# Patient Record
Sex: MALE | Race: BLACK OR AFRICAN AMERICAN | Employment: UNEMPLOYED | URBAN - METROPOLITAN AREA
[De-identification: names, ages, dates, MRNs, and addresses within clinical notes are randomized per-mention and may not be internally consistent; named-entity substitution may affect disease eponyms.]

---

## 2020-05-19 ENCOUNTER — HOSPITAL ENCOUNTER (EMERGENCY)
Facility: CLINIC | Age: 25
Discharge: HOME | End: 2020-05-19
Attending: EMERGENCY MEDICINE | Admitting: EMERGENCY MEDICINE

## 2020-05-19 VITALS
OXYGEN SATURATION: 99 % | HEIGHT: 71 IN | BODY MASS INDEX: 22.68 KG/M2 | SYSTOLIC BLOOD PRESSURE: 131 MMHG | DIASTOLIC BLOOD PRESSURE: 86 MMHG | HEART RATE: 84 BPM | RESPIRATION RATE: 16 BRPM | WEIGHT: 162 LBS | TEMPERATURE: 98.2 F

## 2020-05-19 DIAGNOSIS — R82.90 ABNORMAL URINALYSIS: ICD-10-CM

## 2020-05-19 DIAGNOSIS — N34.2 URETHRITIS: ICD-10-CM

## 2020-05-19 LAB
ALBUMIN UR-MCNC: 30 MG/DL
APPEARANCE UR: ABNORMAL
BACTERIA #/AREA URNS HPF: ABNORMAL /HPF
BILIRUB UR QL STRIP: NEGATIVE
COLOR UR AUTO: YELLOW
GLUCOSE UR STRIP-MCNC: NEGATIVE MG/DL
HGB UR QL STRIP: ABNORMAL
KETONES UR STRIP-MCNC: NEGATIVE MG/DL
LEUKOCYTE ESTERASE UR QL STRIP: ABNORMAL
MUCOUS THREADS #/AREA URNS LPF: PRESENT /LPF
NITRATE UR QL: NEGATIVE
PH UR STRIP: 6 PH (ref 5–7)
RBC #/AREA URNS AUTO: 134 /HPF (ref 0–2)
SOURCE: ABNORMAL
SP GR UR STRIP: 1.02 (ref 1–1.03)
UROBILINOGEN UR STRIP-MCNC: NORMAL MG/DL (ref 0–2)
WBC #/AREA URNS AUTO: >182 /HPF (ref 0–5)
WBC CLUMPS #/AREA URNS HPF: PRESENT /HPF

## 2020-05-19 PROCEDURE — 25000125 ZZHC RX 250: Performed by: EMERGENCY MEDICINE

## 2020-05-19 PROCEDURE — 87591 N.GONORRHOEAE DNA AMP PROB: CPT | Performed by: EMERGENCY MEDICINE

## 2020-05-19 PROCEDURE — 99284 EMERGENCY DEPT VISIT MOD MDM: CPT | Mod: 25

## 2020-05-19 PROCEDURE — 25000128 H RX IP 250 OP 636: Performed by: EMERGENCY MEDICINE

## 2020-05-19 PROCEDURE — 25000132 ZZH RX MED GY IP 250 OP 250 PS 637: Performed by: EMERGENCY MEDICINE

## 2020-05-19 PROCEDURE — 81001 URINALYSIS AUTO W/SCOPE: CPT | Performed by: EMERGENCY MEDICINE

## 2020-05-19 PROCEDURE — 87086 URINE CULTURE/COLONY COUNT: CPT | Performed by: EMERGENCY MEDICINE

## 2020-05-19 PROCEDURE — 96372 THER/PROPH/DIAG INJ SC/IM: CPT

## 2020-05-19 PROCEDURE — 87491 CHLMYD TRACH DNA AMP PROBE: CPT | Performed by: EMERGENCY MEDICINE

## 2020-05-19 RX ORDER — SULFAMETHOXAZOLE/TRIMETHOPRIM 800-160 MG
1 TABLET ORAL 2 TIMES DAILY
Qty: 14 TABLET | Refills: 0 | Status: SHIPPED | OUTPATIENT
Start: 2020-05-19 | End: 2020-05-26

## 2020-05-19 RX ORDER — AZITHROMYCIN 250 MG/1
1000 TABLET, FILM COATED ORAL ONCE
Status: COMPLETED | OUTPATIENT
Start: 2020-05-19 | End: 2020-05-19

## 2020-05-19 RX ADMIN — LIDOCAINE HYDROCHLORIDE 250 MG: 10 INJECTION, SOLUTION EPIDURAL; INFILTRATION; INTRACAUDAL; PERINEURAL at 10:56

## 2020-05-19 RX ADMIN — AZITHROMYCIN MONOHYDRATE 1000 MG: 250 TABLET ORAL at 10:56

## 2020-05-19 SDOH — HEALTH STABILITY: MENTAL HEALTH: HOW OFTEN DO YOU HAVE A DRINK CONTAINING ALCOHOL?: NEVER

## 2020-05-19 ASSESSMENT — MIFFLIN-ST. JEOR: SCORE: 1731.96

## 2020-05-19 NOTE — ED AVS SNAPSHOT
Emergency Department  [address] [place] [id number]  Phone:  [phone]  Fax:  [phone]                                    Taz Jones   MRN: [id number]    Department:   Emergency Department   Date of Visit:  5/19/2020           After Visit Summary Signature Page    I have received my discharge instructions, and my questions have been answered. I have discussed any challenges I see with this plan with the nurse or doctor.    ..........................................................................................................................................  Patient/Patient Representative Signature      ..........................................................................................................................................  Patient Representative Print Name and Relationship to Patient    ..................................................               ................................................  Date                                   Time    ..........................................................................................................................................  Reviewed by Signature/Title    ...................................................              ..............................................  Date                                               Time          22EPIC Rev 08/18

## 2020-05-19 NOTE — ED PROVIDER NOTES
"   History     Chief Complaint: Hematuria and Dysuria    HPI   Taz Chacon is a 25 year old male who presents with hematuria and dysuria.  Starting yesterday, he noticed discomfort when he urinates, which is especially intense at the end of urination, and he has noticed a few drops of blood.  He has no new back pain.  No history of kidney stones.  He has had 2 sexual partners in the last 6 months, though denies any known specific exposure to STDs or prior similar symptoms.  He is not on blood thinners.  He has not taken any analgesics.    Allergies:  No known drug allergies    Medications:    The patient is not currently taking any prescribed medications.    Past Medical History:    Attention deficit hyperactivity disorder   Oppositional defiant disorder of childhood or adolescence    Past Surgical History:    History reviewed. No pertinent surgical history.    Family History:    History reviewed. No pertinent family history.     Social History:  Smoking status: Daily smoker  PCP: Physician No Ref-Primary    Review of Systems   All other systems reviewed and are negative.    Physical Exam     Patient Vitals for the past 24 hrs:   BP Temp Temp src Pulse Resp SpO2 Height Weight   05/19/20 0822 131/86 98.2  F (36.8  C) Oral 84 16 99 % 1.803 m (5' 11\") 73.5 kg (162 lb)     Physical Exam  General: Nontoxic-appearing male sitting upright in room 25  HENT: mucous membranes moist  CV: rate as above  Resp: normal effort, speaks in full phrases  GI: abdomen soft and nontender, no guarding, no palpable masses  MSK:   Thoracic spine: nontender, no CVAT  Lumbar spine: nontender  Pelvis stable.  : normal penis without urethral discharge, nontender testes with normal lie, no scrotal masses or fluctuance, epididymis nontender  Skin: appropriately warm and dry, no erythema/ecchymosis/vesicles to back  Neuro: alert, clear speech, oriented, ambulatory  Psych: normal mood and affect    Emergency Department Course "   Laboratory:  Urinalysis: Moderate blood, protein albumin 30, large leukocyte, WBC/HPF >182 (H), RBC/ (H), WBC clumps present, few bacteria, mucous present, o/w WNL     Chlamydia and gonorrhea: pending  Urine culture: pending    Procedures: None.    Interventions:  1056 Ceftriaxone 250 mg in lidocaine 1% IM  1056 Zithromax 1000 mg PO    Emergency Department Course:  Past medical records, nursing notes, and vitals reviewed.  0938: I performed an exam of the patient and obtained history, as documented above.    The patient provided a urine sample here in the emergency department. This was sent for laboratory testing, findings above.    1030: I rechecked the patient. Findings and plan explained to the patient. Patient discharged home with instructions regarding supportive care, medications, and reasons to return. The importance of close follow-up was reviewed.     Impression & Plan    Medical Decision Making:  I think his symptoms are most likely secondary to urethritis, perhaps a sexually transmitted infection based on his sexual history, diagnostic uncertainty and rationale for empiric treatment was reviewed with him.  Urine culture was sent in addition to Chlamydia and gonorrhea, and I elected to initiate Bactrim as well to treat the possibility of bacterial cystitis, given challenges with obtaining outpatient clinic follow-up during this pandemic.  He does not have flank pain, colicky discomfort, or other features to suggest ureteral stone, pyelonephritis, systemic illness, or the need for further testing or treatment at this time.  He is content with this plan and was discharged home.  He was advised to refrain from sexual intercourse until follow-up testing is back.      Diagnosis:    ICD-10-CM    1. Urethritis  N34.2    2. Abnormal urinalysis  R82.90        Disposition: discharged to home with Bactrim    Discharge Medications:  New Prescriptions    SULFAMETHOXAZOLE-TRIMETHOPRIM (BACTRIM DS) 800-160 MG  TABLET    Take 1 tablet by mouth 2 times daily for 7 days     I, Bradley Carrillo, am serving as a scribe at 9:25 AM on 5/19/2020 to document services personally performed by Gerard Eaton MD based on my observations and the provider's statements to me.     This record was created at least in part using electronic voice recognition software, so please excuse any typographical errors.    Bradley Carrillo  5/19/2020    EMERGENCY DEPARTMENT       Gerard Eaton MD  05/19/20 1181

## 2020-05-20 ENCOUNTER — TELEPHONE (OUTPATIENT)
Dept: EMERGENCY MEDICINE | Facility: CLINIC | Age: 25
End: 2020-05-20

## 2020-05-20 LAB
BACTERIA SPEC CULT: NO GROWTH
C TRACH DNA SPEC QL NAA+PROBE: NEGATIVE
Lab: NORMAL
N GONORRHOEA DNA SPEC QL NAA+PROBE: POSITIVE
SPECIMEN SOURCE: ABNORMAL
SPECIMEN SOURCE: NORMAL
SPECIMEN SOURCE: NORMAL

## 2020-05-20 NOTE — TELEPHONE ENCOUNTER
"[facility] Emergency Department Lab result notification:    [place] ED lab result protocol used  N Gonorrhea protocol    Reason for call  Notify of lab results, assess symptoms,  review ED providers recommendations/discharge instructions (if necessary) and advise per ED lab result f/u protocol    Lab Result   Final N. Gonorrhoeae PCR is [POSITIVE] AND Chlamydia T PCR is [NEGATIVE]   Patient was treated for N. Gonorrhea and Chlamydia T in the ED  [Yes or No]:  Yes       If Yes, list what was given in the ED (dual treatment for N Gonorrhea):  Azithromycin 1000 mg PO x 1 AND Ceftriaxone (Rocephin) 250 mg IM x 1   If treated appropriately in the [facility] ED, notify patient of result and STD instructions.     Final urine culture report shows \"NO GROWTH\" and is NEGATIVE.   Emergency Dept discharge antibiotic: Sulfamethoxazole-Trimethoprim (Bactrim DS, Septra DS) 800-160 mg PO tablet,  1 tablet by mouth 2 times daily for 7 days.   Is ED discharge Rx antibiotic for UTI only (Yes/No): Yes, Abnormal urinalysis   Recommendations per [facility] ED Lab result protocol - Urine culture protocol.   Information table from ED Provider visit on 5/19/2020  Symptoms reported at ED visit (Chief complaint, HPI) Chief Complaint: Hematuria and Dysuria    HPI   Taz CarrilloJoriRobert is a 25 year old male who presents with hematuria and dysuria.  Starting yesterday, he noticed discomfort when he urinates, which is especially intense at the end of urination, and he has noticed a few drops of blood.  He has no new back pain.  No history of kidney stones.  He has had 2 sexual partners in the last 6 months, though denies any known specific exposure to STDs or prior similar symptoms.  He is not on blood thinners.  He has not taken any analgesics.   ED providers Impression and Plan (applicable information) I think his symptoms are most likely secondary to urethritis, perhaps a sexually transmitted infection based on his sexual " history, diagnostic uncertainty and rationale for empiric treatment was reviewed with him.  Urine culture was sent in addition to Chlamydia and gonorrhea, and I elected to initiate Bactrim as well to treat the possibility of bacterial cystitis, given challenges with obtaining outpatient clinic follow-up during this pandemic.  He does not have flank pain, colicky discomfort, or other features to suggest ureteral stone, pyelonephritis, systemic illness, or the need for further testing or treatment at this time.  He is content with this plan and was discharged home.  He was advised to refrain from sexual intercourse until follow-up testing is back.   Miscellaneous information na     RN Assessment (Patient s current Symptoms), include time called.  [Insert Left message here if message left]  1:43PM: Attempted to leave voicemail message requesting a call back to [facility] ED Lab Result RN, JANNIE full.        [RN Name]  Janet Avila RN  [facility] RN  Lung Nodule and ED Lab Result RN  Epic pool (ED late result f/u RN): [id number]  [facility] INCIDENTAL RADIOLOGY F/U NURSES: P [id number]  # [phone]      Copy of Lab result       Urine Culture Aerobic Bacterial   Order: [id number]   Status:  Final result   Visible to patient:  No (not released) Dx:  Urethritis   Specimen Information:  Urine clean catch; Midstream Urine          (important suggestion)   Newer results are available. Click to view them now.    Component  1d ago   Specimen Description  Midstream Urine     Special Requests  Specimen received in preservative     Culture Micro  No growth     Resulting Agency  [facility]          Specimen Collected: 05/19/20  9:35 AM  Last Resulted: 05/20/20 12:37 PM

## 2020-05-21 NOTE — TELEPHONE ENCOUNTER
"[facility] Emergency Department/Urgent Care Lab result notification:    Reason for call  Notify of lab results, assess symptoms,  review ED providers recommendations (if necessary) and advise per ED lab result f/u protocol.  Lab result  Final N. Gonorrhoeae PCR is [POSITIVE] AND Chlamydia T PCR is [NEGATIVE]   Patient was treated for N. Gonorrhea and Chlamydia T in the ED  [Yes or No]:  Yes       If Yes, list what was given in the ED (dual treatment for N Gonorrhea):  Azithromycin 1000 mg PO x 1 AND Ceftriaxone (Rocephin) 250 mg IM x 1   If treated appropriately in the [facility] ED, notify patient of result and STD instructions.      Final urine culture report shows \"NO GROWTH\" and is NEGATIVE.   Emergency Dept discharge antibiotic: Sulfamethoxazole-Trimethoprim (Bactrim DS, Septra DS) 800-160 mg PO tablet,  1 tablet by mouth 2 times daily for 7 days.   Is ED discharge Rx antibiotic for UTI only (Yes/No): Yes, Abnormal urinalysis   Recommendations per [facility] ED Lab result protocol - Urine culture protocol.   2:28 pm Pt informed of lab results, information table from ED Provider visit on 5/19/2020.    STD Patient Instructions:    We recommend that you contact any recent sexual partners within the last 2 months and have them evaluated by a physician.    Avoid sexual activity for 7 to 10 days or until both your and your partner(s) have completed all antibiotic medications.    We advise that you consider following up with your PCP at approximately 3 months for retesting to be sure the infection has cleared.    Per ED lab result protocol, I informed him he could stop the Sulfamethoxazole-Trimethoprim (Bactrim DS, Septra DS) 800-160 mg PO tablet,  1 tablet by mouth 2 times daily for 7 days, since his UC was no growth. He says he would feel more comfortable taking it at this point. The dysuria is better and there is less blood in the urine. Didn't have these symptoms with gonorrhea last year when he had " it.      PCP follow-up Questions asked: YES       Allie Lucero RN  [facility]   Lung Nodule and ED Lab Result F/U RN  Epic pool (ED late result f/u RN): [id number]  # [phone]

## 2021-01-11 ENCOUNTER — TELEPHONE (OUTPATIENT)
Dept: FAMILY MEDICINE | Facility: CLINIC | Age: 26
End: 2021-01-11

## 2021-01-12 ENCOUNTER — TELEPHONE (OUTPATIENT)
Dept: FAMILY MEDICINE | Facility: CLINIC | Age: 26
End: 2021-01-12